# Patient Record
Sex: FEMALE | Race: AMERICAN INDIAN OR ALASKA NATIVE
[De-identification: names, ages, dates, MRNs, and addresses within clinical notes are randomized per-mention and may not be internally consistent; named-entity substitution may affect disease eponyms.]

---

## 2019-04-02 ENCOUNTER — HOSPITAL ENCOUNTER (EMERGENCY)
Dept: HOSPITAL 42 - ED | Age: 34
Discharge: HOME | End: 2019-04-02
Payer: MEDICARE

## 2019-04-02 VITALS — DIASTOLIC BLOOD PRESSURE: 76 MMHG | SYSTOLIC BLOOD PRESSURE: 121 MMHG | HEART RATE: 68 BPM

## 2019-04-02 VITALS — OXYGEN SATURATION: 100 % | TEMPERATURE: 98.4 F

## 2019-04-02 VITALS — RESPIRATION RATE: 18 BRPM

## 2019-04-02 DIAGNOSIS — K80.20: Primary | ICD-10-CM

## 2019-04-02 LAB
ALBUMIN SERPL-MCNC: 3.8 G/DL (ref 3–4.8)
ALBUMIN/GLOB SERPL: 1.1 {RATIO} (ref 1.1–1.8)
ALT SERPL-CCNC: 23 U/L (ref 7–56)
APPEARANCE UR: CLEAR
AST SERPL-CCNC: 23 U/L (ref 14–36)
BACTERIA #/AREA URNS HPF: (no result) /HPF
BASOPHILS # BLD AUTO: 0.01 K/MM3 (ref 0–2)
BASOPHILS NFR BLD: 0.3 % (ref 0–3)
BILIRUB UR-MCNC: NEGATIVE MG/DL
BUN SERPL-MCNC: 9 MG/DL (ref 7–21)
CALCIUM SERPL-MCNC: 8.9 MG/DL (ref 8.4–10.5)
CAOX CRY #/AREA URNS HPF: (no result) /HPF
COLOR UR: YELLOW
EOSINOPHIL # BLD: 0.1 10*3/UL (ref 0–0.7)
EOSINOPHIL NFR BLD: 1.9 % (ref 1.5–5)
EPI CELLS #/AREA URNS HPF: (no result) /HPF (ref 0–5)
ERYTHROCYTE [DISTWIDTH] IN BLOOD BY AUTOMATED COUNT: 16.2 % (ref 11.5–14.5)
GFR NON-AFRICAN AMERICAN: > 60
GLUCOSE UR STRIP-MCNC: NEGATIVE MG/DL
HGB BLD-MCNC: 10.8 G/DL (ref 12–16)
LEUKOCYTE ESTERASE UR-ACNC: NEGATIVE LEU/UL
LIPASE SERPL-CCNC: 72 U/L (ref 23–300)
LYMPHOCYTES # BLD: 1.1 10*3/UL (ref 1.2–3.4)
LYMPHOCYTES NFR BLD AUTO: 33.2 % (ref 22–35)
MCH RBC QN AUTO: 23.8 PG (ref 25–35)
MCHC RBC AUTO-ENTMCNC: 30.4 G/DL (ref 31–37)
MCV RBC AUTO: 78.2 FL (ref 80–105)
MONOCYTES # BLD AUTO: 0.3 10*3/UL (ref 0.1–0.6)
MONOCYTES NFR BLD: 10.7 % (ref 1–6)
PH UR STRIP: 6 [PH] (ref 4.7–8)
PLATELET # BLD: 213 10^3/UL (ref 120–450)
PMV BLD AUTO: 11.7 FL (ref 7–11)
PROT UR STRIP-MCNC: NEGATIVE MG/DL
RBC # BLD AUTO: 4.54 10^6/UL (ref 3.5–6.1)
RBC # UR STRIP: (no result) /UL
SP GR UR STRIP: >= 1.03 (ref 1–1.03)
UROBILINOGEN UR STRIP-ACNC: 0.2 E.U./DL
WBC # BLD AUTO: 3.2 10^3/UL (ref 4.5–11)
WBC #/AREA URNS HPF: (no result) /HPF (ref 0–6)

## 2019-04-02 PROCEDURE — 81025 URINE PREGNANCY TEST: CPT

## 2019-04-02 PROCEDURE — 74176 CT ABD & PELVIS W/O CONTRAST: CPT

## 2019-04-02 PROCEDURE — 76700 US EXAM ABDOM COMPLETE: CPT

## 2019-04-02 PROCEDURE — 85025 COMPLETE CBC W/AUTO DIFF WBC: CPT

## 2019-04-02 PROCEDURE — 83735 ASSAY OF MAGNESIUM: CPT

## 2019-04-02 PROCEDURE — 80053 COMPREHEN METABOLIC PANEL: CPT

## 2019-04-02 PROCEDURE — 81001 URINALYSIS AUTO W/SCOPE: CPT

## 2019-04-02 PROCEDURE — 99284 EMERGENCY DEPT VISIT MOD MDM: CPT

## 2019-04-02 PROCEDURE — 83690 ASSAY OF LIPASE: CPT

## 2019-04-02 NOTE — US
Date of service: 



04/02/2019



HISTORY:

abd pain/gall bladder stone



COMPARISON:

None.



TECHNIQUE:

Sonographic evaluation of the abdomen.



FINDINGS:



LIVER:

Measures 12.9 cm.  Normal echogenicity of the liver parenchyma. No 

mass. No intrahepatic bile duct dilatation.



GALLBLADDER:

Cholelithiasis.  Nonmobile calculus in gallbladder neck.  No mural 

thickening.  No pericholecystic fluid.  Negative sonographic Bill 

sign.



COMMON BILE DUCT:

Measures 2 mm. No stones. No dilatation.



PANCREAS:

Unremarkable as visualized. No mass. No ductal dilatation.



RIGHT KIDNEY:

Measures 10.5cm.  Normal echogenicity. No calculus, mass, or 

hydronephrosis.



LEFT KIDNEY:

Measures 10.1cm.  Normal echogenicity. No calculus, mass, or 

hydronephrosis.



SPLEEN:

Normal in size and contour. No mass.



AORTA:

No aneurysmal dilatation. 



IVC:

Unremarkable. 



OTHER FINDINGS:

None. 



IMPRESSION:

Cholelithiasis without sonographic evidence of cholecystitis.

## 2019-04-02 NOTE — CT
Date of service: 



04/02/2019



PROCEDURE:  CT Abdomen and Pelvis without intravenous contrast



HISTORY:

right abd pain



COMPARISON:

None.



TECHNIQUE:

Without contrast.. 



Contrast dose: 



Radiation dose:



Total exam DLP = 255.67 mGy-cm.



This CT exam was performed using one or more of the following dose 

reduction techniques: Automated exposure control, adjustment of the 

mA and/or kV according to patient size, and/or use of iterative 

reconstruction technique.



FINDINGS:



LOWER THORAX:

Unremarkable. 



LIVER:

Unremarkable. No gross lesion or ductal dilatation.  



GALLBLADDER AND BILE DUCTS:

Multiple rim calcified large gallstones are seen measuring up to 2 cm 

in diameter.  There is a stone in the neck of the gallbladder and the 

gallbladder is distended and elongated. 



PANCREAS:

There is questionable swelling of the pancreatic head suggestive of 

pancreatitis.  Pancreas is difficult to visualize due to lack of 

abdominal fat and lack oral and IV contrast.



SPLEEN:

Unremarkable. 



ADRENALS:

Unremarkable. No mass. 



KIDNEYS AND URETERS:

Unremarkable. No hydronephrosis. No solid mass. 



VASCULATURE:

Unremarkable. No aortic aneurysm. No aortic atherosclerotic 

calcification or mural plaque present.



BOWEL:

Unremarkable. No obstruction. No gross mural thickening. 



APPENDIX:

Unremarkable. Normal appendix. 



PERITONEUM:

Unremarkable. No free fluid. No free air. 



LYMPH NODES:

Unremarkable. No enlarged lymph nodes. 



BLADDER:

Unremarkable. 



REPRODUCTIVE:

Unremarkable. 



BONES:

No acute fracture. 



OTHER FINDINGS:

None.



IMPRESSION:

Multiple rim calcified large gallstones are seen measuring up to 2 cm 

in diameter.  There is a stone in the neck of the gallbladder and the 

gallbladder is distended and elongated.



There is questionable swelling of the pancreatic head suggestive of 

pancreatitis.  Pancreas is difficult to visualize due to lack of 

abdominal fat and lack oral and IV contrast.

## 2022-02-01 NOTE — ED PDOC
Arrival/HPI





- General


Chief Complaint: Abdominal Pain


Time Seen by Provider: 19 07:08


Historian: Patient





- History of Present Illness


Narrative History of Present Illness (Text): 





19 07:08


Sherry Kaplan is a 33 year old female, with a history of mental illness and 

deafness, who presents to the emergency department complaining of constant right

sided lower rib pain since last night. Patient informs eating rice pudding prior

to onset of pain. Patient rates pain as 10/10 and denies taking pain medication.

Patient states pain is worsened when putting pressure on the right side or 

moving. Patient states having similar symptoms 3-4 months ago and did not seek 

medical attention. Patient states symptoms resolved at that time. Patient's last

menstrual period was 3/7/2019. Last bowel movement was 2 weeks ago. Patient d

enies any fevers, chills, headache, dizziness, chest pain, shortness of breath, 

dyspnea on exertion, cough, diaphoresis, nausea, vomiting, diarrhea, back pain, 

neck pain, or any other complaint.





Time/Duration: 24 hours


Symptom Onset: Gradual


Symptom Course: Unchanged


Activities at Onset: Light


Context: Home





Past Medical History





- Provider Review


Nursing Documentation Reviewed: Yes





- Infectious Disease


Hx of Infectious Diseases: None





- Cardiac


Hx Cardiac Disorders: No





- Pulmonary


Hx Respiratory Disorders: No





- Neurological


Hx Neurological Disorder: No





- HEENT


Hx HEENT Disorder: Yes


Hx Deafness: Yes





- Renal


Hx Renal Disorder: No





- Endocrine/Metabolic


Hx Endocrine Disorders: No





- Hematological/Oncological


Hx Blood Disorders: No





- Integumentary


Hx Dermatological Disorder: No





- Musculoskeletal/Rheumatological


Hx Musculoskeletal Disorders: No





- Gastrointestinal


Hx Gastrointestinal Disorders: No





- Genitourinary/Gynecological


Hx Genitourinary Disorders: No





- Psychiatric


Hx Psychophysiologic Disorder: Yes


Hx Depression: Yes


Hx Substance Use: No





- Surgical History


Hx  Section: Yes (x2)





- Anesthesia


Hx Anesthesia: Yes


Hx Anesthesia Reactions: No


Hx Malignant Hyperthermia: No





Family/Social History





- Physician Review


Nursing Documentation Reviewed: Yes


Family/Social History: Unknown Family HX


Smoking Status: Never Smoked


Hx Alcohol Use: No


Hx Substance Use: No





Allergies/Home Meds


Allergies/Adverse Reactions: 


Allergies





No Known Allergies Allergy (Verified 19 06:27)


   








Home Medications: 


                                    Home Meds











 Medication  Instructions  Recorded  Confirmed


 


Escitalopram [Lexapro] 20 mg PO DAILY 19














Review of Systems





- Physician Review


All systems were reviewed & negative as marked: Yes





- Review of Systems


Constitutional: absent: Fevers, Other (chills)


Respiratory: absent: SOB, Cough


Cardiovascular: absent: Chest Pain, ABURTO


Gastrointestinal: Abdominal Pain (lower right rib pain).  absent: Diarrhea, 

Nausea, Vomiting


Genitourinary Female: absent: Dysuria, Hematuria, Urine Output Changes


Musculoskeletal: absent: Other (trauma)


Neurological: absent: Headache, Dizziness


Endocrine: absent: Diaphoresis





Physical Exam


Vital Signs Reviewed: Yes





Vital Signs











  Temp Pulse Resp BP Pulse Ox


 


 19 06:27  98.4 F  94 H  16  110/69  100











Temperature: Afebrile


Blood Pressure: Normal


Pulse: Regular


Respiratory Rate: Normal


Appearance: Positive for: Well-Appearing, Non-Toxic, Comfortable


Pain Distress: None


Mental Status: Positive for: Alert and Oriented X 3





- Systems Exam


Head: Present: Atraumatic, Normocephalic


Pupils: Present: PERRL


Extroacular Muscles: Present: EOMI


Conjunctiva: Present: Normal


Mouth: Present: Moist Mucous Membranes


Neck: Present: Normal Range of Motion


Respiratory/Chest: Present: Clear to Auscultation, Good Air Exchange.  No: 

Respiratory Distress, Accessory Muscle Use


Cardiovascular: Present: Regular Rate and Rhythm, Normal S1, S2.  No: Murmurs


Abdomen: Present: Tenderness (RLQ tenderness to palpation ), Normal Bowel 

Sounds.  No: Distention, Peritoneal Signs, Rebound, Guarding


Back: Present: Normal Inspection.  No: CVA Tenderness


Upper Extremity: Present: Normal Inspection.  No: Cyanosis, Edema


Lower Extremity: Present: Normal Inspection.  No: Edema


Neurological: Present: GCS=15, CN II-XII Intact, Speech Normal


Skin: Present: Warm, Dry, Normal Color.  No: Rashes


Psychiatric: Present: Alert, Oriented x 3, Normal Insight, Normal Concentration





Medical Decision Making


ED Course and Treatment: 





19 07:08 


Impression: Patient is a 33 year old female who presents to the emergency 

department complaining of lower right sided rib pain since last night. Patient 

informs of eating rice pudding prior to onset of pain. On exam, RLQ tenderness 

with no rebound or guarding. No CVA tenderness.





Plan:


-- Labs 


-- IV Fluids


-- POC Urine Pregnancy Test


-- Urinalysis w/Micro


-- Reassess and disposition





Prior Visits:


Notes and results from previous visits were reviewed. 





Progress Notes:


19 08:20 


Discussed results of labs and workup. Due to blood in urine, ordering CT AP w/o 

PO or IV contrast to r/o kidney stones.








19 


CT results as above and d/w patient.  Ultrasound ordered to evaluate GB.  

Patient agreeable w/POC. 





19 12:28





US neg for cholecystitis. Due to findings on CT (? swelling of pancreas 

head/?pancreatitis- pt w/o vomiting, lipase negative) and symptoms, counseled 

importance of follow up with gastroenterologist. Presented opportunity for 

questions which were answered. Of note, patient declined pain medication during 

her course in the ED and states she will take Tylenol or Motrin at home. Patient

 stable for d/c home and is agreeable w/POC.














- RAD Interpretation


Narrative RAD Interpretations (Text): 





19 09:37


CT AP w/o IV shows: 


IMPRESSION:


Multiple rim calcified large gallstones are seen measuring up to 2 cm in 

diameter.  There is a stone in the neck of the gallbladder and the gallbladder 

is distended and elongated.


There is questionable swelling of the pancreatic head suggestive of pancreati

tis.  Pancreas is difficult to visualize due to lack of abdominal fat and lack 

oral and IV contrast.





19 12:04 


Abdominal US shows:


IMPRESSION:


Cholelithiasis without sonographic evidence of cholecystitis





: Radiologist





- Medication Orders


Current Medication Orders: 











Sodium Chloride (Sodium Chloride 0.9%)  1,000 mls @ 1,000 mls/hr IV .Q1H STA


   Stop: 19 08:17











- Scribe Statement


The provider has reviewed the documentation as recorded by the Scribe


Miguel Angel Wayne 





All medical record entries made by the Scribe were at my direction and 

personally dictated by me. I have reviewed the chart and agree that the record 

accurately reflects my personal performance of the history, physical exam, 

medical decision making, and the department course for this patient. I have also

 personally directed, reviewed, and agree with the discharge instructions and 

disposition. 








Disposition/Present on Arrival





- Present on Arrival


Any Indicators Present on Arrival: No


History of DVT/PE: No


History of Uncontrolled Diabetes: No


Urinary Catheter: No


History of Decub. Ulcer: No


History Surgical Site Infection Following: None





- Disposition


Have Diagnosis and Disposition been Completed?: Yes


Diagnosis: 


 Abdominal pain, Cholelithiasis





Disposition: HOME/ ROUTINE


Disposition Time: 12:25


Patient Plan: Discharge


Condition: GOOD


Discharge Instructions (ExitCare):  Acute Abdomen (Belly Pain), Adult (DC), 

Gallstones (DC)


Additional Instructions: 





SHERRY KAPLAN, thank you for letting us take care of you today. Your provider was

 Jennifer Gonzales MD and you were treated for RIGHT SIDE RIB PAIN. The emergency 

medical care you received today was directed at your acute symptoms. If you were

 prescribed any medication, please fill it and take as directed. It may take 

several days for your symptoms to resolve. Return to the Emergency Department if

 your symptoms worsen, do not improve, or if you have any other problems.





Please contact your doctor or call one of the physicians/clinics you have been 

referred to that are listed on the Patient Visit Information form that is 

included in your discharge packet. Bring any paperwork you were given at 

discharge with you along with any medications you are taking to your follow up 

visit. Our treatment cannot replace ongoing medical care by a primary care 

provider outside of the emergency department.





Thank you for allowing the [facility] team to be part of your care today.








Referrals: 


Elizabeth Holguin MD [Staff Provider] - Follow up with primary


[facility] [Outside] - Follow up with primary


[facility] [place] - Follow up with primary


Frances Lewis MD [Medical Doctor] - Follow up with primary


Forms:  [employer] (English)
No